# Patient Record
Sex: MALE | Race: BLACK OR AFRICAN AMERICAN | URBAN - METROPOLITAN AREA
[De-identification: names, ages, dates, MRNs, and addresses within clinical notes are randomized per-mention and may not be internally consistent; named-entity substitution may affect disease eponyms.]

---

## 2020-06-15 ENCOUNTER — TELEPHONE ENCOUNTER (OUTPATIENT)
Dept: URBAN - METROPOLITAN AREA CLINIC 96 | Facility: CLINIC | Age: 65
End: 2020-06-15

## 2020-07-01 ENCOUNTER — LAB OUTSIDE AN ENCOUNTER (OUTPATIENT)
Dept: URBAN - METROPOLITAN AREA TELEHEALTH 2 | Facility: TELEHEALTH | Age: 65
End: 2020-07-01

## 2020-07-01 ENCOUNTER — OFFICE VISIT (OUTPATIENT)
Dept: URBAN - METROPOLITAN AREA TELEHEALTH 2 | Facility: TELEHEALTH | Age: 65
End: 2020-07-01
Payer: COMMERCIAL

## 2020-07-01 DIAGNOSIS — Z83.71 FAMILY HISTORY OF COLON POLYPS: ICD-10-CM

## 2020-07-01 DIAGNOSIS — K63.5 COLON POLYPS: ICD-10-CM

## 2020-07-01 DIAGNOSIS — I48.91 ATRIAL FIBRILLATION: ICD-10-CM

## 2020-07-01 DIAGNOSIS — Z80.0 FAMILY HISTORY OF STOMACH CANCER: ICD-10-CM

## 2020-07-01 PROCEDURE — 99213 OFFICE O/P EST LOW 20 MIN: CPT | Performed by: INTERNAL MEDICINE

## 2020-07-01 PROCEDURE — G8417 CALC BMI ABV UP PARAM F/U: HCPCS | Performed by: INTERNAL MEDICINE

## 2020-07-01 PROCEDURE — 3017F COLORECTAL CA SCREEN DOC REV: CPT | Performed by: INTERNAL MEDICINE

## 2020-07-01 PROCEDURE — G9903 PT SCRN TBCO ID AS NON USER: HCPCS | Performed by: INTERNAL MEDICINE

## 2020-07-01 RX ORDER — BISACODYL 5 MG
1-3 TABLETS TABLET, DELAYED RELEASE (ENTERIC COATED) ORAL
Qty: 9 | Refills: 0 | OUTPATIENT
Start: 2020-07-01 | End: 2020-07-04

## 2020-07-01 RX ORDER — ONDANSETRON HYDROCHLORIDE 4 MG/1
1 TABLET 15 MIN BEFORE EACH DOSE OF COLON PREP TABLET, FILM COATED ORAL TWICE A DAY
Qty: 2 TABLET | Refills: 0 | OUTPATIENT
Start: 2020-07-01

## 2020-07-01 NOTE — HPI-TODAY'S VISIT:
Today, 7/1/20, patient reports his last colonoscopy was 5 years revealing colon polyps per patient. Patient's father has a history of colon polyps. Patient's mother had a history of stomach cancer in her 60s.  He had a normal EGD in 2014.  Patient notes he has been taking thyroid medication for the past 2-3 years.  Patient notes he has not had any episodes of atrial fib in some time. He was diagnosed with atrial fib in 2014. He takes Aspirin 81mg; patient needs to go off of Aspirin 5 days before colonoscopy. Patient notes of occasional constipation. Denies change in bowel habit. Time spent with patient via Telehealth:16 min plus preparation of documents

## 2020-07-01 NOTE — PHYSICAL EXAM HENT:
Head , normocephalic , atraumatic , Face , Face within normal limits , Ears , External ears within normal limits , Nose/Nasopharynx , External nose  normal appearance

## 2020-07-23 ENCOUNTER — OFFICE VISIT (OUTPATIENT)
Dept: URBAN - METROPOLITAN AREA SURGERY CENTER 16 | Facility: SURGERY CENTER | Age: 65
End: 2020-07-23
Payer: COMMERCIAL

## 2020-07-23 DIAGNOSIS — K63.5 BENIGN COLON POLYP: ICD-10-CM

## 2020-07-23 DIAGNOSIS — Z86.010 H/O ADENOMATOUS POLYP OF COLON: ICD-10-CM

## 2020-07-23 PROCEDURE — G9936 PMH PLYP/NEO CO/RECT/JUN/ANS: HCPCS | Performed by: INTERNAL MEDICINE

## 2020-07-23 PROCEDURE — G8907 PT DOC NO EVENTS ON DISCHARG: HCPCS | Performed by: INTERNAL MEDICINE

## 2020-07-23 PROCEDURE — 45380 COLONOSCOPY AND BIOPSY: CPT | Performed by: INTERNAL MEDICINE

## 2022-01-07 ENCOUNTER — OFFICE VISIT (OUTPATIENT)
Dept: URBAN - METROPOLITAN AREA TELEHEALTH 2 | Facility: TELEHEALTH | Age: 67
End: 2022-01-07
Payer: COMMERCIAL

## 2022-01-07 DIAGNOSIS — I48.91 ATRIAL FIBRILLATION: ICD-10-CM

## 2022-01-07 DIAGNOSIS — K59.01 SLOW TRANSIT CONSTIPATION: ICD-10-CM

## 2022-01-07 DIAGNOSIS — K63.5 COLON POLYPS: ICD-10-CM

## 2022-01-07 PROCEDURE — 99214 OFFICE O/P EST MOD 30 MIN: CPT | Performed by: INTERNAL MEDICINE

## 2022-01-07 RX ORDER — ONDANSETRON HYDROCHLORIDE 4 MG/1
1 TABLET 15 MIN BEFORE EACH DOSE OF COLON PREP TABLET, FILM COATED ORAL TWICE A DAY
Qty: 2 TABLET | Refills: 0 | Status: ACTIVE | COMMUNITY
Start: 2020-07-01

## 2022-01-07 NOTE — HPI-TODAY'S VISIT:
within last year or so hx of afib, sees dr. walker constipation triggers  an afib episode last visit with dr walker advised to f/u  had a colonosocpy with dr. cardenas august 2020  no full BM triggers an episode of afib  mom w/ stomach cancer 12 years ago was tested for h.pylori dr. lopez tested him for h.pylori, tested posiitve, did 2 weeks and then retested and it was normal. started taking CALM daily, every week takes smooth move tea for a full BM may be not enough h20 intake  ================================================================= 7/1/20, patient reports his last colonoscopy was 5 years revealing colon polyps per patient. Patient's father has a history of colon polyps. Patient's mother had a history of stomach cancer in her 60s.  He had a normal EGD in 2014.  Patient notes he has been taking thyroid medication for the past 2-3 years.  Patient notes he has not had any episodes of atrial fib in some time. He was diagnosed with atrial fib in 2014. He takes Aspirin 81mg; patient needs to go off of Aspirin 5 days before colonoscopy. Patient notes of occasional constipation. Denies change in bowel habit.

## 2022-01-12 ENCOUNTER — OFFICE VISIT (OUTPATIENT)
Dept: URBAN - METROPOLITAN AREA SURGERY CENTER 18 | Facility: SURGERY CENTER | Age: 67
End: 2022-01-12
Payer: COMMERCIAL

## 2022-01-12 DIAGNOSIS — K29.30 CHRONIC SUPERFICIAL GASTRITIS: ICD-10-CM

## 2022-01-12 DIAGNOSIS — K22.89 OTHER SPECIFIED DISEASE OF ESOPHAGUS: ICD-10-CM

## 2022-01-12 PROCEDURE — G8907 PT DOC NO EVENTS ON DISCHARG: HCPCS | Performed by: INTERNAL MEDICINE

## 2022-01-12 PROCEDURE — 43239 EGD BIOPSY SINGLE/MULTIPLE: CPT | Performed by: INTERNAL MEDICINE

## 2022-01-12 RX ORDER — ONDANSETRON HYDROCHLORIDE 4 MG/1
1 TABLET 15 MIN BEFORE EACH DOSE OF COLON PREP TABLET, FILM COATED ORAL TWICE A DAY
Qty: 2 TABLET | Refills: 0 | Status: ACTIVE | COMMUNITY
Start: 2020-07-01

## 2022-02-24 ENCOUNTER — TELEPHONE ENCOUNTER (OUTPATIENT)
Dept: URBAN - METROPOLITAN AREA CLINIC 96 | Facility: CLINIC | Age: 67
End: 2022-02-24

## 2022-03-04 ENCOUNTER — DASHBOARD ENCOUNTERS (OUTPATIENT)
Age: 67
End: 2022-03-04

## 2022-03-04 ENCOUNTER — OFFICE VISIT (OUTPATIENT)
Dept: URBAN - METROPOLITAN AREA TELEHEALTH 2 | Facility: TELEHEALTH | Age: 67
End: 2022-03-04
Payer: COMMERCIAL

## 2022-03-04 VITALS — BODY MASS INDEX: 30.48 KG/M2 | HEIGHT: 72 IN | WEIGHT: 225 LBS

## 2022-03-04 DIAGNOSIS — K63.5 COLON POLYPS: ICD-10-CM

## 2022-03-04 DIAGNOSIS — K21.00 GASTROESOPHAGEAL REFLUX DISEASE WITH ESOPHAGITIS WITHOUT HEMORRHAGE: ICD-10-CM

## 2022-03-04 DIAGNOSIS — I48.91 ATRIAL FIBRILLATION: ICD-10-CM

## 2022-03-04 DIAGNOSIS — K59.01 SLOW TRANSIT CONSTIPATION: ICD-10-CM

## 2022-03-04 PROBLEM — 35298007: Status: ACTIVE | Noted: 2022-01-07

## 2022-03-04 PROBLEM — 266433003: Status: ACTIVE | Noted: 2022-03-04

## 2022-03-04 PROBLEM — 49436004 ATRIAL FIBRILLATION: Status: ACTIVE | Noted: 2022-01-07

## 2022-03-04 PROCEDURE — 99213 OFFICE O/P EST LOW 20 MIN: CPT | Performed by: INTERNAL MEDICINE

## 2022-03-04 RX ORDER — ONDANSETRON HYDROCHLORIDE 4 MG/1
1 TABLET 15 MIN BEFORE EACH DOSE OF COLON PREP TABLET, FILM COATED ORAL TWICE A DAY
Qty: 2 TABLET | Refills: 0 | Status: ACTIVE | COMMUNITY
Start: 2020-07-01

## 2022-03-04 NOTE — HPI-TODAY'S VISIT:
3/4/2022 terrible sx since last week  had egd in [facility], was having a lot of issues, when he would bend over he felt like he awas going to pass out.started nexium last week, feeling so much better constiaption is much better, going daily. egd w/ bx: mild inflammation and reflux, no hpylori   ==================================== within last year or so hx of afib, sees dr. walker constipation triggers  an afib episode last visit with dr walker advised to f/u  had a colonosocpy with dr. cardenas august 2020  no full BM triggers an episode of afib  mom w/ stomach cancer 12 years ago was tested for h.pylori dr. lopez tested him for h.pylori, tested posiitve, did 2 weeks and then retested and it was normal. started taking CALM daily, every week takes smooth move tea for a full BM may be not enough h20 intake  ================================================================= 7/1/20, patient reports his last colonoscopy was 5 years revealing colon polyps per patient. Patient's father has a history of colon polyps. Patient's mother had a history of stomach cancer in her 60s.  He had a normal EGD in 2014.  Patient notes he has been taking thyroid medication for the past 2-3 years.  Patient notes he has not had any episodes of atrial fib in some time. He was diagnosed with atrial fib in 2014. He takes Aspirin 81mg; patient needs to go off of Aspirin 5 days before colonoscopy. Patient notes of occasional constipation. Denies change in bowel habit.